# Patient Record
Sex: FEMALE | Race: WHITE | URBAN - METROPOLITAN AREA
[De-identification: names, ages, dates, MRNs, and addresses within clinical notes are randomized per-mention and may not be internally consistent; named-entity substitution may affect disease eponyms.]

---

## 2022-12-06 ENCOUNTER — OFFICE VISIT (OUTPATIENT)
Dept: OBGYN CLINIC | Age: 32
End: 2022-12-06

## 2022-12-06 VITALS
SYSTOLIC BLOOD PRESSURE: 124 MMHG | WEIGHT: 116 LBS | BODY MASS INDEX: 20.55 KG/M2 | DIASTOLIC BLOOD PRESSURE: 70 MMHG | HEIGHT: 63 IN

## 2022-12-06 DIAGNOSIS — N81.4 CYSTOCELE WITH PROLAPSE: ICD-10-CM

## 2022-12-06 DIAGNOSIS — Z11.51 SCREENING FOR HUMAN PAPILLOMAVIRUS: ICD-10-CM

## 2022-12-06 DIAGNOSIS — Z01.419 ENCOUNTER FOR WELL WOMAN EXAM WITH ROUTINE GYNECOLOGICAL EXAM: ICD-10-CM

## 2022-12-06 DIAGNOSIS — Z01.419 ENCOUNTER FOR WELL WOMAN EXAM WITH ROUTINE GYNECOLOGICAL EXAM: Primary | ICD-10-CM

## 2022-12-06 ASSESSMENT — ENCOUNTER SYMPTOMS
BLOOD IN STOOL: 0
ALLERGIC/IMMUNOLOGIC NEGATIVE: 1
NAUSEA: 0
DIARRHEA: 0
RESPIRATORY NEGATIVE: 1
RECTAL PAIN: 0
VOMITING: 0
ANAL BLEEDING: 0
CONSTIPATION: 0
ABDOMINAL DISTENTION: 0
ABDOMINAL PAIN: 0
EYES NEGATIVE: 1

## 2022-12-06 ASSESSMENT — PATIENT HEALTH QUESTIONNAIRE - PHQ9
SUM OF ALL RESPONSES TO PHQ QUESTIONS 1-9: 0
SUM OF ALL RESPONSES TO PHQ9 QUESTIONS 1 & 2: 0
SUM OF ALL RESPONSES TO PHQ QUESTIONS 1-9: 0
SUM OF ALL RESPONSES TO PHQ QUESTIONS 1-9: 0
1. LITTLE INTEREST OR PLEASURE IN DOING THINGS: 0
2. FEELING DOWN, DEPRESSED OR HOPELESS: 0
SUM OF ALL RESPONSES TO PHQ QUESTIONS 1-9: 0

## 2022-12-06 ASSESSMENT — VISUAL ACUITY: OU: 1

## 2022-12-06 NOTE — PROGRESS NOTES
Patient here     Vitals:  /70   Ht 5' 3\" (1.6 m)   Wt 116 lb (52.6 kg)   LMP 2022 (Exact Date)   BMI 20.55 kg/m²   Past Medical History:   Diagnosis Date    Asthma     as a child    Heart murmur     as a child     Past Surgical History:   Procedure Laterality Date    TONSILLECTOMY Bilateral      Allergies:  Patient has no known allergies. No current outpatient medications on file. No current facility-administered medications for this visit. Social History     Socioeconomic History    Marital status: Single     Spouse name: Not on file    Number of children: Not on file    Years of education: Not on file    Highest education level: Not on file   Occupational History    Not on file   Tobacco Use    Smoking status: Former     Types: Cigarettes     Quit date:      Years since quittin.9    Smokeless tobacco: Never   Vaping Use    Vaping Use: Never used   Substance and Sexual Activity    Alcohol use: Yes     Comment: occasionally/ weekends    Drug use: Never    Sexual activity: Yes     Partners: Male     Comment: no BC. Other Topics Concern    Not on file   Social History Narrative    Not on file     Social Determinants of Health     Financial Resource Strain: Not on file   Food Insecurity: Not on file   Transportation Needs: Not on file   Physical Activity: Not on file   Stress: Not on file   Social Connections: Not on file   Intimate Partner Violence: Not on file   Housing Stability: Not on file        Family History   Problem Relation Age of Onset    Endometriosis Mother     Gall Bladder Disease Mother        Review of Systems   Constitutional: Negative. Negative for activity change, appetite change, chills, diaphoresis, fatigue, fever and unexpected weight change. HENT: Negative. Eyes: Negative. Respiratory: Negative. Cardiovascular: Negative.     Gastrointestinal:  Negative for abdominal distention, abdominal pain, anal bleeding, blood in stool, constipation, diarrhea, nausea, rectal pain and vomiting. Endocrine: Negative. Genitourinary:  Negative for decreased urine volume, difficulty urinating, dyspareunia, dysuria, enuresis, flank pain, frequency, genital sores, hematuria, menstrual problem, pelvic pain, urgency, vaginal bleeding, vaginal discharge and vaginal pain. Musculoskeletal: Negative. Skin: Negative. Allergic/Immunologic: Negative. Neurological: Negative. Hematological: Negative. Psychiatric/Behavioral: Negative. Objective:     Physical Exam  Constitutional:       General: She is not in acute distress. Appearance: Normal appearance. She is well-developed. She is not diaphoretic. HENT:      Head: Normocephalic and atraumatic. Eyes:      Conjunctiva/sclera: Conjunctivae normal.   Cardiovascular:      Rate and Rhythm: Normal rate and regular rhythm. Pulmonary:      Effort: Pulmonary effort is normal. No respiratory distress. Abdominal:      General: There is no distension or abdominal bruit. Palpations: Abdomen is soft. Abdomen is not rigid. There is no shifting dullness, fluid wave, hepatomegaly, splenomegaly, mass or pulsatile mass. Tenderness: There is no abdominal tenderness. There is no guarding or rebound. Negative signs include Perez's sign and McBurney's sign. Hernia: No hernia is present. There is no hernia in the umbilical area, ventral area, left inguinal area or right inguinal area. Genitourinary:     Labia:         Right: No rash, tenderness, lesion or injury. Left: No rash, tenderness, lesion or injury. Urethra: No prolapse, urethral pain, urethral swelling or urethral lesion. Vagina: Normal. No signs of injury and foreign body. No vaginal discharge, erythema, tenderness or bleeding. Cervix: No cervical motion tenderness, discharge, friability, lesion, erythema, cervical bleeding or eversion.       Uterus: Normal. Not deviated, not enlarged, not fixed, not tender and no uterine prolapse. Adnexa: Right adnexa normal and left adnexa normal.        Right: No mass, tenderness or fullness. Left: No mass, tenderness or fullness. Rectum: Normal.      Comments: No cervical masses or CMT. No pelvic or adnexal masses; NT.    Musculoskeletal:         General: No tenderness or deformity. Normal range of motion. Cervical back: Normal range of motion and neck supple. Skin:     General: Skin is warm and dry. Coloration: Skin is not pale. Neurological:      Mental Status: She is alert and oriented to person, place, and time. Motor: No abnormal muscle tone. Coordination: Coordination normal.   Psychiatric:         Behavior: Behavior normal.         Thought Content: Thought content normal.         Judgment: Judgment normal.       Assessment:       {No diagnosis found. (Refresh or delete this SmartLink)}     Plan:      Medications placedthis encounter:  No orders of the defined types were placed in this encounter. Orders placedthis encounter:  No orders of the defined types were placed in this encounter. Follow up:  No follow-ups on file.

## 2022-12-06 NOTE — PATIENT INSTRUCTIONS
Patient Education        Cystocele: Care Instructions  Overview     Cystocele (bladder prolapse) occurs when the bladder moves from its normal position and presses against the front wall of the vagina. This is also called anterior vaginal wall prolapse. Cystocele can happen when the muscles and tissues that hold the bladder in place are weak or damaged. This can be caused by pregnancy and childbirth, being overweight, or frequent constipation. Or the muscles and tissues may get weaker as you age. A cystocele usually does not cause serious health problems. But it can be painful or uncomfortable. You may find relief by making lifestyle changes and doing exercises to make the pelvic muscles stronger. Or your doctor may suggest a pessary to help with symptoms. Surgery may also be an option. Follow-up care is a key part of your treatment and safety. Be sure to make and go to all appointments, and call your doctor if you are having problems. It's also a good idea to know your test results and keep a list of the medicines you take. How can you care for yourself at home? Do not lift heavy objects or do anything that puts pressure on your pelvic muscles. Try pelvic floor (Kegel) exercises. These tighten and strengthen pelvic muscles. (If doing these exercises causes pain, stop doing them and talk with your doctor.)  Squeeze your muscles as if you were trying not to pass gas. Or squeeze your muscles as if you were stopping the flow of urine. Your belly, legs, and buttocks shouldn't move. Hold the squeeze for 3 seconds, then relax for 5 to 10 seconds. Start with 3 seconds, then add 1 second each week until you are able to squeeze for 10 seconds. Repeat the exercise 10 times a session. Do 3 to 8 sessions a day. Lie down and put a pillow under your knees. This eases pressure on your vagina. You also can lie on your side and bring your knees up to your chest.  Ask your doctor about a vaginal pessary.  You can place this in your vagina. It supports the bladder. Your doctor can teach you how and when to remove, clean, and reinsert it. If your doctor prescribes vaginal estrogen cream, use it exactly as prescribed. When should you call for help? Watch closely for changes in your health, and be sure to contact your doctor if you have any problems. Where can you learn more? Go to https://chpepiaeweb.Grokr. org and sign in to your [employer] account. Enter S624 in the Net[employer] box to learn more about \"Cystocele: Care Instructions. \"     If you do not have an account, please click on the \"Sign Up Now\" link. Current as of: November 22, 2021               Content Version: 13.4  © 2006-2022 [employer]. Care instructions adapted under license by [facility] ([facility]). If you have questions about a medical condition or this instruction, always ask your healthcare professional. [place] [address] any warranty or liability for your use of this information. Patient Education        Kegel Exercises: Care Instructions  Overview     Kegel exercises strengthen muscles around the bladder. These muscles control the flow of urine. Kegel exercises are sometime called \"pelvic floor\" exercises. They can help prevent urine leakage and keep the pelvic organs in place. Kegel exercises can strengthen pelvic muscles that have been weakened by age, pregnancy, childbirth, and surgery. They may help prevent or treat urine leakage. You do Kegel exercises by squeezing your pelvic floor muscles. You will likely need to do these exercises for several weeks to get better. Follow-up care is a key part of your treatment and safety. Be sure to make and go to all appointments, and call your doctor if you are having problems. It's also a good idea to know your test results and keep a list of the medicines you take. How can you care for yourself at home?   To do Kegel exercises:  Squeeze your muscles as if you were trying not to pass gas. Or squeeze your muscles as if you were stopping the flow of urine. Your belly, legs, and buttocks shouldn't move. Hold the squeeze for 3 seconds, then relax for 5 to 10 seconds. Start with 3 seconds, then add 1 second each week until you are able to squeeze for 10 seconds. Repeat the exercise 10 times a session. Do 3 to 8 sessions a day. When learning what muscles to squeeze, you can try stopping the flow of urine a few times. But don't make it a practice to do Kegels while urinating. If doing these exercises causes pain, stop doing them and talk with your doctor. Sometimes people have pelvic floor muscles that are too tight. In these cases, doing Kegel exercises may cause more problems. Check with your doctor if you don't notice a difference after trying these exercises for several weeks. Your doctor may suggest getting help from a physical therapist or recommend other treatment. Where can you learn more? Go to https://Get10.Purple Harry. org and sign in to your [employer] account. Enter C047 in the [employer] box to learn more about \"Kegel Exercises: Care Instructions. \"     If you do not have an account, please click on the \"Sign Up Now\" link. Current as of: June 16, 2022               Content Version: 13.4  © 9013-0106 [employer]. Care instructions adapted under license by [facility] ([facility]). If you have questions about a medical condition or this instruction, always ask your healthcare professional. [place] [address] any warranty or liability for your use of this information.

## 2022-12-06 NOTE — PROGRESS NOTES
Subjective:      Patient ID: Azeem Win is a 34 y.o. female    Annual exam.  New patient; reviewed medical, surgical, social and family history. Also reviewed current medications and allergies. No GYN complaints. Normal cycles. Pap performed. STD screening offered. Monthly SBE encouraged. F/U annual or prn. Pt also wished to discuss vaginal pressure and occasional frequency. Denies dysuria or urgency. Denies incontinence. States she notices pressure more with increased activity. Denies chronic UTI or pain. Exam with 2-3rd degree cystocele with Valsalva. Discussed Kegel exercises and reduction of heavy lifting pushing and pulling. Also discussed surgical management if problem progresses. All questions answered. Vitals:  /70   Ht 5' 3\" (1.6 m)   Wt 116 lb (52.6 kg)   LMP 2022 (Exact Date)   BMI 20.55 kg/m²   Past Medical History:   Diagnosis Date    Asthma     as a child    Heart murmur     as a child     Past Surgical History:   Procedure Laterality Date    TONSILLECTOMY Bilateral      Allergies:  Patient has no known allergies. No current outpatient medications on file. No current facility-administered medications for this visit. Social History     Socioeconomic History    Marital status: Single     Spouse name: Not on file    Number of children: Not on file    Years of education: Not on file    Highest education level: Not on file   Occupational History    Not on file   Tobacco Use    Smoking status: Former     Types: Cigarettes     Quit date:      Years since quittin.9    Smokeless tobacco: Never   Vaping Use    Vaping Use: Never used   Substance and Sexual Activity    Alcohol use: Yes     Comment: occasionally/ weekends    Drug use: Never    Sexual activity: Yes     Partners: Male     Comment: no BC.    Other Topics Concern    Not on file   Social History Narrative    Not on file     Social Determinants of Health     Financial Resource Strain: Not on file   Food Insecurity: Not on file   Transportation Needs: Not on file   Physical Activity: Not on file   Stress: Not on file   Social Connections: Not on file   Intimate Partner Violence: Not on file   Housing Stability: Not on file     Family History   Problem Relation Age of Onset    Endometriosis Mother     Gall Bladder Disease Mother        Review of Systems   Constitutional: Negative. Negative for activity change, appetite change, chills, diaphoresis, fatigue, fever and unexpected weight change. HENT: Negative. Eyes: Negative. Respiratory: Negative. Cardiovascular: Negative. Gastrointestinal:  Negative for abdominal distention, abdominal pain, anal bleeding, blood in stool, constipation, diarrhea, nausea, rectal pain and vomiting. Endocrine: Negative. Genitourinary:  Positive for frequency and vaginal pain (Pressure intermittently). Negative for decreased urine volume, difficulty urinating, dyspareunia, dysuria, enuresis, flank pain, genital sores, hematuria, menstrual problem, pelvic pain, urgency, vaginal bleeding and vaginal discharge. Musculoskeletal: Negative. Skin: Negative. Allergic/Immunologic: Negative. Neurological: Negative. Hematological: Negative. Psychiatric/Behavioral: Negative. Objective:     Physical Exam  Constitutional:       Appearance: She is well-developed. HENT:      Head: Normocephalic. Eyes:      General: Lids are normal. Vision grossly intact. Neck:      Thyroid: No thyromegaly. Cardiovascular:      Rate and Rhythm: Normal rate and regular rhythm. Heart sounds: Normal heart sounds. Pulmonary:      Effort: Pulmonary effort is normal. No respiratory distress. Breath sounds: Normal breath sounds. No wheezing or rales. Chest:      Chest wall: No tenderness. Breasts:     Right: Normal. No swelling, bleeding, inverted nipple, mass, nipple discharge, skin change or tenderness.       Left: Normal. No swelling, bleeding, inverted nipple, mass, nipple discharge, skin change or tenderness. Abdominal:      General: There is no distension. Palpations: Abdomen is soft. There is no mass. Tenderness: There is no abdominal tenderness. There is no guarding or rebound. Hernia: No hernia is present. There is no hernia in the left inguinal area or right inguinal area. Genitourinary:     General: Normal vulva. Pubic Area: No rash. Labia:         Right: No rash, tenderness, lesion or injury. Left: No rash, tenderness, lesion or injury. Urethra: No prolapse, urethral swelling or urethral lesion. Vagina: No signs of injury and foreign body. Prolapsed vaginal walls (2-3ed degree cystocele with Valsalva) present. No vaginal discharge, erythema, tenderness or bleeding. Cervix: No cervical motion tenderness, discharge or friability. Uterus: Not deviated, not enlarged, not fixed and not tender. Adnexa:         Right: No mass, tenderness or fullness. Left: No mass, tenderness or fullness. Rectum: Normal.   Musculoskeletal:         General: No tenderness. Normal range of motion. Cervical back: Normal range of motion and neck supple. Lymphadenopathy:      Cervical: No cervical adenopathy. Upper Body:      Right upper body: No supraclavicular or axillary adenopathy. Left upper body: No supraclavicular or axillary adenopathy. Lower Body: No right inguinal adenopathy. No left inguinal adenopathy. Skin:     General: Skin is warm and dry. Coloration: Skin is not pale. Findings: No erythema or rash. Neurological:      Mental Status: She is alert and oriented to person, place, and time. Psychiatric:         Behavior: Behavior normal.         Thought Content: Thought content normal.         Judgment: Judgment normal.       Assessment:       Diagnosis Orders   1. Encounter for well woman exam with routine gynecological exam  PAP SMEAR      2. Screening for human papillomavirus  PAP SMEAR      3. Cystocele with prolapse  POCT Urinalysis no Micro           Plan:      Medications placedthis encounter:  No orders of the defined types were placed in this encounter. Orders placedthis encounter:  Orders Placed This Encounter   Procedures    PAP SMEAR     Patient History:    Patient's last menstrual period was 2022 (exact date). OBGYN Status: Having periods  Past Surgical History:  No date: TONSILLECTOMY; Bilateral      Social History    Tobacco Use      Smoking status: Former        Types: Cigarettes        Quit date:         Years since quittin.9      Smokeless tobacco: Never       Standing Status:   Future     Standing Expiration Date:   2023     Order Specific Question:   Collection Type     Answer: Thin Prep     Order Specific Question:   Prior Abnormal Pap Test     Answer:   No     Order Specific Question:   Screening or Diagnostic     Answer:   Screening     Order Specific Question:   HPV Requested? Answer:   Yes     Order Specific Question:   High Risk Patient     Answer:   N/A    POCT Urinalysis no Micro           Follow up:  Return in about 1 year (around 2023) for Annual.  Repeat Annual GYN exam every 1 year. Cervical Cytology exam starts age 24. If Negative Cytology;  follow-up screening per current guidelines. Mammograms yearly starting at age 36. Calcium and Vitamin D dosing reviewed ( age appropriate ). Colonoscopy and bone density screening discussed ( age appropriate ). Birth control and STD prevention discussed ( age appropriate ). Gardisil counseling completed for all patients ( age appropriate ). Routine health maintenance ( per PCP and guidelines ).

## 2022-12-13 LAB
HPV COMMENT: NORMAL
HPV TYPE 16: NOT DETECTED
HPV TYPE 18: NOT DETECTED
HPVOH (OTHER TYPES): NOT DETECTED

## 2023-03-09 LAB — SARS-COV-2 RESULT: NOT DETECTED

## 2023-05-11 ENCOUNTER — OFFICE VISIT (OUTPATIENT)
Dept: PRIMARY CARE | Facility: CLINIC | Age: 33
End: 2023-05-11
Payer: COMMERCIAL

## 2023-05-11 VITALS
TEMPERATURE: 98.1 F | HEART RATE: 85 BPM | HEIGHT: 63 IN | BODY MASS INDEX: 19.99 KG/M2 | WEIGHT: 112.8 LBS | SYSTOLIC BLOOD PRESSURE: 105 MMHG | DIASTOLIC BLOOD PRESSURE: 68 MMHG

## 2023-05-11 DIAGNOSIS — O34.80: ICD-10-CM

## 2023-05-11 DIAGNOSIS — G43.909 MIGRAINE WITHOUT STATUS MIGRAINOSUS, NOT INTRACTABLE, UNSPECIFIED MIGRAINE TYPE: Primary | ICD-10-CM

## 2023-05-11 PROCEDURE — 99203 OFFICE O/P NEW LOW 30 MIN: CPT | Performed by: FAMILY MEDICINE

## 2023-05-11 PROCEDURE — 1036F TOBACCO NON-USER: CPT | Performed by: FAMILY MEDICINE

## 2023-05-11 RX ORDER — SUMATRIPTAN 50 MG/1
50 TABLET, FILM COATED ORAL ONCE AS NEEDED
Qty: 27 TABLET | Refills: 3 | Status: SHIPPED | OUTPATIENT
Start: 2023-05-11 | End: 2024-05-10

## 2023-05-11 NOTE — PROGRESS NOTES
"Anjelica Stout is a 33 y.o. female who presents for Follow-up (Re-est/Discuss migraines/).    Migraine trigers are premenstrual, chocolate, added sugar    3 migraine days a month  Severe and disabling ~1/3 at a time      Always last at least 1 day up to 4 days duration, average suration of migrian e2 days      Dx with cystocele, thinks pap was 1/2023  Kegel exercises have helped         Objective   /68   Pulse 85   Temp 36.7 °C (98.1 °F)   Ht 1.6 m (5' 3\")   Wt 51.2 kg (112 lb 12.8 oz)   BMI 19.98 kg/m²     Physical Exam  Vitals reviewed.   Constitutional:       General: She is not in acute distress.     Appearance: Normal appearance.   HENT:      Head: Normocephalic and atraumatic.   Cardiovascular:      Rate and Rhythm: Normal rate and regular rhythm.      Heart sounds: No murmur heard.  Pulmonary:      Effort: Pulmonary effort is normal.      Breath sounds: No wheezing, rhonchi or rales.   Musculoskeletal:      Right lower leg: No edema.      Left lower leg: No edema.   Neurological:      Mental Status: She is alert.   Psychiatric:         Mood and Affect: Mood normal.         Behavior: Behavior normal.         Assessment/Plan   Problem List Items Addressed This Visit          Genitourinary    Cystocele affecting postpartum period    Relevant Orders    Referral to Physical Therapy    Referral to Urology       Other    Migraine - Primary    Relevant Medications    SUMAtriptan (Imitrex) 50 mg tablet   Medication risks and benefits discussed.  Patient advised to follow up as needed or if any concerns arise.         "

## 2023-08-24 ENCOUNTER — OFFICE VISIT (OUTPATIENT)
Dept: PRIMARY CARE | Facility: CLINIC | Age: 33
End: 2023-08-24
Payer: COMMERCIAL

## 2023-08-24 VITALS
TEMPERATURE: 98.1 F | SYSTOLIC BLOOD PRESSURE: 112 MMHG | DIASTOLIC BLOOD PRESSURE: 79 MMHG | HEART RATE: 97 BPM | HEIGHT: 63 IN | BODY MASS INDEX: 20.04 KG/M2 | WEIGHT: 113.13 LBS

## 2023-08-24 DIAGNOSIS — G43.909 MIGRAINE WITHOUT STATUS MIGRAINOSUS, NOT INTRACTABLE, UNSPECIFIED MIGRAINE TYPE: ICD-10-CM

## 2023-08-24 DIAGNOSIS — Z00.00 ANNUAL PHYSICAL EXAM: ICD-10-CM

## 2023-08-24 DIAGNOSIS — O34.80: Primary | ICD-10-CM

## 2023-08-24 PROBLEM — D22.39 MELANOCYTIC NEVI OF OTHER PARTS OF FACE: Status: RESOLVED | Noted: 2022-09-23 | Resolved: 2023-08-24

## 2023-08-24 PROCEDURE — 99213 OFFICE O/P EST LOW 20 MIN: CPT | Performed by: NURSE PRACTITIONER

## 2023-08-24 PROCEDURE — 1036F TOBACCO NON-USER: CPT | Performed by: NURSE PRACTITIONER

## 2023-08-24 ASSESSMENT — ENCOUNTER SYMPTOMS
WEAKNESS: 0
SLEEP DISTURBANCE: 0
VOMITING: 0
CONSTIPATION: 0
NAUSEA: 0
COUGH: 0
AGITATION: 0
NERVOUS/ANXIOUS: 0
FEVER: 0
SHORTNESS OF BREATH: 0
DIARRHEA: 0
FATIGUE: 0

## 2023-08-24 ASSESSMENT — PATIENT HEALTH QUESTIONNAIRE - PHQ9
2. FEELING DOWN, DEPRESSED OR HOPELESS: NOT AT ALL
1. LITTLE INTEREST OR PLEASURE IN DOING THINGS: NOT AT ALL
SUM OF ALL RESPONSES TO PHQ9 QUESTIONS 1 AND 2: 0

## 2023-08-24 NOTE — PROGRESS NOTES
"Subjective   Patient ID: Juju Stout is a 33 y.o. female who presents for Annual Exam.    Also need physical form filled out for work. No complaints currently         Review of Systems   Constitutional:  Negative for fatigue and fever.   Respiratory:  Negative for cough and shortness of breath.    Cardiovascular:  Negative for chest pain and leg swelling.   Gastrointestinal:  Negative for constipation, diarrhea, nausea and vomiting.   Skin:  Negative for pallor and rash.   Neurological:  Negative for weakness.   Psychiatric/Behavioral:  Negative for agitation, behavioral problems and sleep disturbance. The patient is not nervous/anxious.        Objective   /79   Pulse 97   Temp 36.7 °C (98.1 °F)   Ht 1.6 m (5' 3\")   Wt 51.3 kg (113 lb 2 oz)   BMI 20.04 kg/m²     Physical Exam  Vitals and nursing note reviewed.   Constitutional:       General: She is not in acute distress.  HENT:      Head: Normocephalic and atraumatic.   Eyes:      Pupils: Pupils are equal, round, and reactive to light.   Cardiovascular:      Rate and Rhythm: Normal rate and regular rhythm.   Pulmonary:      Effort: Pulmonary effort is normal.      Breath sounds: Normal breath sounds.   Skin:     General: Skin is warm and dry.   Neurological:      Mental Status: She is alert.   Psychiatric:         Mood and Affect: Mood normal.         Assessment/Plan   Problem List Items Addressed This Visit       Migraine    Relevant Orders    Basic Metabolic Panel    CBC    TSH with reflex to Free T4 if abnormal    Vitamin D, Total    Lipid Panel    Cystocele affecting postpartum period - Primary    Relevant Orders    Basic Metabolic Panel    CBC    TSH with reflex to Free T4 if abnormal    Vitamin D, Total    Lipid Panel          "

## 2024-02-14 ENCOUNTER — OFFICE VISIT (OUTPATIENT)
Dept: PRIMARY CARE | Facility: CLINIC | Age: 34
End: 2024-02-14
Payer: COMMERCIAL

## 2024-02-14 VITALS
TEMPERATURE: 98.2 F | HEART RATE: 75 BPM | BODY MASS INDEX: 20.39 KG/M2 | DIASTOLIC BLOOD PRESSURE: 71 MMHG | WEIGHT: 115.13 LBS | SYSTOLIC BLOOD PRESSURE: 123 MMHG

## 2024-02-14 DIAGNOSIS — O34.80: ICD-10-CM

## 2024-02-14 DIAGNOSIS — R22.30 NODULE OF SKIN OF UPPER EXTREMITY: ICD-10-CM

## 2024-02-14 DIAGNOSIS — R09.82 POST-NASAL DRIP: ICD-10-CM

## 2024-02-14 DIAGNOSIS — H61.21 IMPACTED CERUMEN OF RIGHT EAR: ICD-10-CM

## 2024-02-14 DIAGNOSIS — R49.9 HOARSENESS OR CHANGING VOICE: Primary | ICD-10-CM

## 2024-02-14 PROCEDURE — 99213 OFFICE O/P EST LOW 20 MIN: CPT | Performed by: NURSE PRACTITIONER

## 2024-02-14 PROCEDURE — 1036F TOBACCO NON-USER: CPT | Performed by: NURSE PRACTITIONER

## 2024-02-14 ASSESSMENT — ENCOUNTER SYMPTOMS
AGITATION: 0
COUGH: 0
NAUSEA: 0
SLEEP DISTURBANCE: 0
CONSTIPATION: 0
TROUBLE SWALLOWING: 0
FEVER: 0
SORE THROAT: 0
NERVOUS/ANXIOUS: 0
VOICE CHANGE: 1
SHORTNESS OF BREATH: 0
SINUS PRESSURE: 0
DIARRHEA: 0
VOMITING: 0
FATIGUE: 0
WEAKNESS: 0

## 2024-02-14 NOTE — PROGRESS NOTES
Subjective   Patient ID: Juju Stout is a 34 y.o. female who presents for Hoarseness (Loss of voice 3wks ago and ever since has been constantly hourse/Does sing and can't sing anymore due to , wants to make sure nothing is wrong/No covid sx).    She had a virus 3 -4 weeks ago, she still has a horse voice, she is a kennedy and likes to talk. She is concerned she will not get her voice back. She currently has no other URI symptoms.    RT ear is with impacted cerumen, staff unable to flush or remove, only a little.     She has a LT wrist nodule that has been there for a year or more, it has grown in size. IT is firm and not very mobile. She states it only bothers her once in a while.    She has had some pelvic floor issues post partum and was referred to PT but she never went, requesting another referral         Review of Systems   Constitutional:  Negative for fatigue and fever.   HENT:  Positive for voice change. Negative for congestion, ear pain, postnasal drip, sinus pressure, sore throat and trouble swallowing.    Respiratory:  Negative for cough and shortness of breath.    Cardiovascular:  Negative for chest pain and leg swelling.   Gastrointestinal:  Negative for constipation, diarrhea, nausea and vomiting.   Skin:  Negative for pallor and rash.   Neurological:  Negative for weakness.   Psychiatric/Behavioral:  Negative for agitation, behavioral problems and sleep disturbance. The patient is not nervous/anxious.        Objective   /71   Pulse 75   Temp 36.8 °C (98.2 °F)   Wt 52.2 kg (115 lb 2 oz)   BMI 20.39 kg/m²     Physical Exam  Vitals and nursing note reviewed.   Constitutional:       General: She is not in acute distress.  HENT:      Head: Normocephalic and atraumatic.      Right Ear: There is impacted cerumen.      Left Ear: Tympanic membrane and ear canal normal.      Mouth/Throat:      Mouth: Mucous membranes are moist.      Pharynx: Oropharynx is clear.      Comments: Mild erythema  Eyes:       Pupils: Pupils are equal, round, and reactive to light.   Cardiovascular:      Rate and Rhythm: Normal rate and regular rhythm.   Pulmonary:      Effort: Pulmonary effort is normal.      Breath sounds: Normal breath sounds.   Skin:     General: Skin is warm and dry.   Neurological:      Mental Status: She is alert.   Psychiatric:         Mood and Affect: Mood normal.         Assessment/Plan   Problem List Items Addressed This Visit             ICD-10-CM    Cystocele affecting postpartum period O34.80    Relevant Orders    Referral to Physical Therapy    Impacted cerumen of right ear H61.21     Attempts to flush unsuccessful, referred to ENT  Use Debrox gtt OTC BID x 5 days           Other Visit Diagnoses         Codes    Hoarseness or changing voice    -  Primary R49.9    Relevant Orders    Referral to ENT    Nodule of skin of upper extremity     R22.30    Relevant Orders    US nonvascular extremity US extremity nonvascular real time w image documentation complete    Post-nasal drip     R09.82

## 2024-02-15 ENCOUNTER — HOSPITAL ENCOUNTER (OUTPATIENT)
Dept: RADIOLOGY | Facility: CLINIC | Age: 34
Discharge: HOME | End: 2024-02-15
Payer: COMMERCIAL

## 2024-02-15 DIAGNOSIS — R22.30 NODULE OF SKIN OF UPPER EXTREMITY: ICD-10-CM

## 2024-02-15 PROCEDURE — 76881 US COMPL JOINT R-T W/IMG: CPT

## 2024-02-15 PROCEDURE — 76882 US LMTD JT/FCL EVL NVASC XTR: CPT | Performed by: RADIOLOGY

## 2024-05-08 ENCOUNTER — TELEPHONE (OUTPATIENT)
Dept: PRIMARY CARE | Facility: CLINIC | Age: 34
End: 2024-05-08
Payer: COMMERCIAL

## 2024-05-08 NOTE — TELEPHONE ENCOUNTER
Pt calling saying she had a ultrasound in February and is calling for the results. She was never notified and would like a call back

## 2024-05-09 NOTE — TELEPHONE ENCOUNTER
Left msg for patient that we received her message.  I also let her know that Ayesha tried to call her but there was no answer. I also left msg that  msg of the results was sent to her thru [employer] and not sure if she is using [employer] but that she does have access to it.    I asked her to give us a call back to let us know she got this message or to send us a message thru [employer] to let us know.

## 2024-05-30 ENCOUNTER — OFFICE VISIT (OUTPATIENT)
Dept: OTOLARYNGOLOGY | Facility: CLINIC | Age: 34
End: 2024-05-30
Payer: COMMERCIAL

## 2024-05-30 VITALS
BODY MASS INDEX: 20.39 KG/M2 | SYSTOLIC BLOOD PRESSURE: 115 MMHG | TEMPERATURE: 97.3 F | HEIGHT: 63 IN | DIASTOLIC BLOOD PRESSURE: 70 MMHG

## 2024-05-30 DIAGNOSIS — K21.9 LARYNGOPHARYNGEAL REFLUX (LPR): Primary | ICD-10-CM

## 2024-05-30 DIAGNOSIS — R49.0 HOARSENESS: ICD-10-CM

## 2024-05-30 DIAGNOSIS — J35.1 LINGUAL TONSIL HYPERTROPHY: ICD-10-CM

## 2024-05-30 DIAGNOSIS — J39.2 CYST OF NASOPHARYNX: ICD-10-CM

## 2024-05-30 PROCEDURE — 1036F TOBACCO NON-USER: CPT | Performed by: OTOLARYNGOLOGY

## 2024-05-30 PROCEDURE — 31575 DIAGNOSTIC LARYNGOSCOPY: CPT | Performed by: OTOLARYNGOLOGY

## 2024-05-30 PROCEDURE — 99204 OFFICE O/P NEW MOD 45 MIN: CPT | Performed by: OTOLARYNGOLOGY

## 2024-05-30 RX ORDER — OMEPRAZOLE 20 MG/1
CAPSULE, DELAYED RELEASE ORAL
Qty: 90 CAPSULE | Refills: 3 | Status: SHIPPED | OUTPATIENT
Start: 2024-05-30

## 2024-05-30 NOTE — PROGRESS NOTES
Impression:  1. Laryngopharyngeal reflux (LPR)  omeprazole (PriLOSEC) 20 mg DR capsule      2. Hoarseness  Referral to ENT      3. Lingual tonsil hypertrophy        4. Cyst of nasopharynx             RECOMMENDATIONS/PLAN :  I explained to the patient that she does have some swelling along the base of her tongue/lingual tonsils as well as some mild swelling along the back of her larynx due to laryngopharyngeal reflux.  We will place her on omeprazole 20 mg daily for the next 5 to 6 months.  She will continue to drink plenty of water and avoid excessive caffeine.  I reassured the patient that she has a very small benign-appearing cyst in her nasopharynx and we will simply follow this.  She will call and let me know how she is doing over the next 5 to 6 months.      **This electronic medical record note was created with the use of voice recognition software.  Despite proofreading, typographical or grammatical errors may be present that could affect meaning of content **    Subjective   Patient ID:     Juju Stout is a 34 y.o. female who presents to the office today complaining of intermittent hoarseness.  Her voice is slightly better today.  She denies significant allergy drainage or postnasal drip.  Sometimes she has a fullness feeling in her throat.  She does clear her throat frequently.  She did stop smoking and she is drinking a fair amount of caffeine.  She used to sing quite frequently however this has been somewhat difficult for her.  She denies any true heartburn symptoms.    ROS:  A detailed 12 system review of systems is noted on the intake form has been reviewed with the patient with details noted in the HPI and scanned into the patient's medical record.    Objective     Past Medical History:   Diagnosis Date    Melanocytic nevi of other parts of face 09/23/2022        Past Surgical History:   Procedure Laterality Date    OTHER SURGICAL HISTORY  09/16/2019    Tonsillectomy        Allergies   Allergen  "Reactions    Cefaclor Swelling          Current Outpatient Medications:     omeprazole (PriLOSEC) 20 mg DR capsule, 20 mg by mouth daily, Disp: 90 capsule, Rfl: 3    SUMAtriptan (Imitrex) 50 mg tablet, Take 1 tablet (50 mg) by mouth 1 time if needed for migraine. May repeat after 2 hours., Disp: 27 tablet, Rfl: 3     Tobacco Use: Low Risk  (5/30/2024)    Patient History     Smoking Tobacco Use: Never     Smokeless Tobacco Use: Never     Passive Exposure: Not on file        Alcohol Use: Not on file        Social History     Substance and Sexual Activity   Drug Use Never        Physical Exam:  Visit Vitals  /70   Temp 36.3 °C (97.3 °F) (Temporal)   Ht 1.6 m (5' 3\")   BMI 20.39 kg/m²   Smoking Status Never   BSA 1.52 m²      General: Patient is alert, oriented, cooperative in no apparent distress.  Head: Normocephalic, atraumatic.  Eyes: PERRL, EOMI, Conjunctiva is clear. No nystagmus.  Ears: Right Ear-- Pinna is normal.  External auditory canal is patent. Tympanic membrane is [intact, translucent and has good mobility with my pneumatic otoscope. No effusion].  Mastoid is nontender.  Left ear-- Pinna is normal.  External auditory canal is patent. Tympanic membrane is [intact, translucent and has good mobility with my pneumatic otoscope.  No effusion].  Mastoid is nontender.  Nose: Septum is straight.  No septal perforation or lesions. No septal hematoma/ seroma.  No signs of bleeding.  Inferior turbinates are normal.   No evidence of intranasal polyps.  No infectious drainage.  Throat:  Floor of mouth is clear, no masses.  Tongue appears normal, no lesions or masses. Gums, gingiva, buccal mucosa appear pink and moist, no lesions. Teeth are in good repair.  No obvious dental infections.  Peritonsillar regions appear symmetric without swelling.  Hard and soft palate appear normal, no obvious cleft. Uvula is midline.  Oropharynx: No lesions. Retropharyngeal wall is flat.  No active postnasal drip.  Neck: Supple,  no " lymphadenopathy.  No masses.  Salivary Glands: Symmetric bilaterally.  No palpable masses.  No evidence of acute infection or salivary stones  Neurologic: Cranial Nerves 2-12 are grossly intact without focal deficits. Cerebellar function testing is normal.     Results:   []    Procedure:   Pre Op Diagnosis: Hoarseness-rule out vocal cord nodules  Post Op Diagnosis: Same  Procedure: Flexible Nasopharyngolaryngoscopy  Surgeon: Marvin Trotter DO  Assist: None  Anesthesia: Topical Lidocaine 4%/ 0.05% Afrin 1:1 mixture  EBL: None  Complications: None  Disposition: The patient tolerated the procedure well. There were no complications.    Procedure:  After informed consent was obtained with the risks, benefits, complications and alternatives explained to the patient/ guardian, the patient was sat up in the ENT chair and the nose was anesthetized and decongested with topical  4% lidocaine and 0.05% Afrin. After allowing this to take effect, the flexible nasopharyngoscope was advanced thru the nostrils and down to the larynx. The following areas were visualized:  The nasal passages, septum, nasopharynx, sinus ostia, base of tongue, epiglottis, true and false vocal folds, arytenoids, post cricoid region and subglottis were all examined and found to be normal except as follows:  Septum is relatively straight.  Her ostiomeatal complexes are clear bilaterally.  No pus polyps or lesions.  Nasopharynx reveals a very small central benign-appearing cyst/Tornwaldt cyst.   Base of tongue reveals moderate lingual tonsil hypertrophy.  Epiglottis is normal.  True and false vocal cords are approximating equally bilaterally.  No nodules polyps or lesions.  Arytenoids show mild to moderate edema consistent with silent reflux/laryngopharyngeal reflux.      The patient tolerated the procedure well and there were no complications.      Marvin Trotter DO

## 2024-06-24 DIAGNOSIS — G43.909 MIGRAINE WITHOUT STATUS MIGRAINOSUS, NOT INTRACTABLE, UNSPECIFIED MIGRAINE TYPE: ICD-10-CM

## 2024-06-24 RX ORDER — SUMATRIPTAN 50 MG/1
50 TABLET, FILM COATED ORAL ONCE AS NEEDED
Qty: 27 TABLET | Refills: 3 | Status: SHIPPED | OUTPATIENT
Start: 2024-06-24 | End: 2025-06-24

## 2025-04-23 ENCOUNTER — OFFICE VISIT (OUTPATIENT)
Dept: URGENT CARE | Age: 35
End: 2025-04-23
Payer: COMMERCIAL

## 2025-04-23 VITALS
TEMPERATURE: 97.8 F | RESPIRATION RATE: 18 BRPM | HEIGHT: 63 IN | DIASTOLIC BLOOD PRESSURE: 72 MMHG | OXYGEN SATURATION: 97 % | BODY MASS INDEX: 20.38 KG/M2 | SYSTOLIC BLOOD PRESSURE: 117 MMHG | HEART RATE: 80 BPM | WEIGHT: 115 LBS

## 2025-04-23 DIAGNOSIS — R68.89 FLU-LIKE SYMPTOMS: ICD-10-CM

## 2025-04-23 LAB
POC CORONAVIRUS SARS-COV-2 PCR: NEGATIVE
POC HUMAN RHINOVIRUS PCR: NEGATIVE
POC INFLUENZA A VIRUS PCR: NEGATIVE
POC INFLUENZA B VIRUS PCR: NEGATIVE
POC RESPIRATORY SYNCYTIAL VIRUS PCR: NEGATIVE

## 2025-04-23 RX ORDER — ONDANSETRON 4 MG/1
4 TABLET, ORALLY DISINTEGRATING ORAL EVERY 8 HOURS PRN
Qty: 10 TABLET | Refills: 0 | Status: SHIPPED | OUTPATIENT
Start: 2025-04-23 | End: 2025-04-30

## 2025-04-23 ASSESSMENT — PATIENT HEALTH QUESTIONNAIRE - PHQ9
1. LITTLE INTEREST OR PLEASURE IN DOING THINGS: NOT AT ALL
2. FEELING DOWN, DEPRESSED OR HOPELESS: NOT AT ALL
SUM OF ALL RESPONSES TO PHQ9 QUESTIONS 1 & 2: 0

## 2025-04-23 ASSESSMENT — PAIN SCALES - GENERAL: PAINLEVEL_OUTOF10: 0-NO PAIN

## 2025-04-23 NOTE — LETTER
April 23, 2025     Patient: Juju Stout   YOB: 1990   Date of Visit: 4/23/2025       To Whom It May Concern:    Juju Stout was seen in my clinic on 4/23/2025 at 11:05 am. Please excuse Juju for her absence from work on this day to make the appointment.    If you have any questions or concerns, please don't hesitate to call.         Sincerely,         [facility]        CC: No Recipients  
Yes

## 2025-04-23 NOTE — PROGRESS NOTES
"Subjective   Patient ID: Juju Stout is a 35 y.o. female. They present today with a chief complaint of Generalized Body Aches, Fatigue, Nausea (Started today ), Cough (Started yesterday ), and Diarrhea (Started today ).    Patient disposition: Home    History of Present Illness  HPI  1-2 day of bodyaches, fatigue, nausea, diarrhea, cough.  Taking ibuprofen with some improvement of symptoms.  Feels fatigued this morning, slept more than normal.  Patient works with infants at work, several of them were sick lately, coughing, 1 with GI symptoms.  Has been able to eat eat small amount of food this morning.  No other medications taken.  No seasonal allergies this time of year.  Childhood asthma.  No runny nose or congestion.  No other complaints or symptoms.      Past Medical History  Allergies as of 04/23/2025 - Reviewed 05/30/2024   Allergen Reaction Noted    Cefaclor Swelling 05/11/2023       Prescriptions Prior to Admission[1]     Current Medications[2]    Problem List[3]    Surgical History[4]     reports that she has never smoked. She has never been exposed to tobacco smoke. She has never used smokeless tobacco. She reports current alcohol use. She reports that she does not use drugs.    Review of Systems  As noted in HPI. ROS otherwise negative unless noted.       Objective    Vitals:    04/23/25 1029   BP: 117/72   BP Location: Left arm   Patient Position: Sitting   Pulse: 80   Resp: 18   Temp: 36.6 °C (97.8 °F)   SpO2: 97%   Weight: 52.2 kg (115 lb)   Height: 1.6 m (5' 3\")     Patient's last menstrual period was 04/02/2025.    Physical Exam  Constitutional: vital signs reviewed. Well developed, well nourished. patient alert and patient without distress.   Head and Face: Normal and atraumatic.    Ears, Nose, Mouth, and Throat:   Hearing: Normal.  External inspection of nose: Normal.   Lips, teeth, tongue and gums: Normal and well hydrated. External inspection of ears: Normal. Ear canals and TMs: Normal.  " Posterior pharynx moist, no exudate, symmetric, no abscess, and with post nasal drip.  Nasal mucosa normal  Neck: No neck mass was observed. Supple. normal muscle tone.   Abdomen: Soft, epigastric tender, nondistended, normal bowel sounds, no masses.  Normal bowel sounds x 4  Cardiovascular: Heart rate normal, normal S1 and S2, no gallops, no murmurs and no pericardial rub. Rhythm: Normal.  Pulmonary: No respiratory distress. Palpation of chest: Normal. Clear bilateral breath sounds.   Lymphatic: No cervical lymphadenopathy  Psych: Normal mood and affect        Procedures    Point of Care Test & Imaging Results from this visit  Results for orders placed or performed in visit on 03/08/23   Sars-CoV-2 PCR, Symptomatic    Collection Time: 03/08/23  5:16 PM   Result Value Ref Range    SARS-CoV-2 Result NOT DETECTED Not Detected            Diagnostic study results (if any) were reviewed.  (If applicable) preliminary radiology reading: None    Assessment/Plan   Allergies, medications, history, and pertinent labs/EKGs/Imaging reviewed.        Medical Decision Making  See note    Orders and Diagnoses  Diagnoses and all orders for this visit:  Cough in adult  -     POCT SPOTFIRE R/ST Panel Mini w/COVID ([facility]) manually resulted      Medical Admin Record      Follow Up Instructions  No follow-ups on file.    At time of discharge patient was clinically well-appearing and HDS for outpatient management. The patient and/or family was educated regarding diagnosis, supportive care, OTC and Rx medications. The patient and/or family was given the opportunity to ask questions prior to discharge and all questions answered. They verbalized understanding of my discussion of the plans for treatment, expected course, indications to return to  or seek further evaluation in ED, and the need for timely follow up as directed.      Electronically signed by [facility]           [1] (Not in a hospital admission)  [2]   Current  Outpatient Medications   Medication Sig Dispense Refill    omeprazole (PriLOSEC) 20 mg DR capsule 20 mg by mouth daily 90 capsule 3    SUMAtriptan (Imitrex) 50 mg tablet Take 1 tablet (50 mg) by mouth 1 time if needed for migraine. May repeat after 2 hours. 27 tablet 3     No current facility-administered medications for this visit.   [3]   Patient Active Problem List  Diagnosis    Migraine    Cystocele affecting postpartum period ([facility])    Annual physical exam    Impacted cerumen of right ear   [4]   Past Surgical History:  Procedure Laterality Date    OTHER SURGICAL HISTORY  09/16/2019    Tonsillectomy

## 2025-04-23 NOTE — PATIENT INSTRUCTIONS
A rapid PCR test was performed today including tests for Influenza A, influenza B, RSV, rhinovirus (common cold virus), Covid-19.  The results were: Negative    Electrolyte solutions containing Glucose (not Artificial Sweetener) or Pedialyte, Rehydrate or Infalyte in children  Gatorade or similar, but requires 1:1 dilution with water to half strength    Early reintroduction of food is recommended  BRAT diet: Includes Bananas, rice, apple sauce, toast, soup, crackers    Agents that may worsen diarrhea: Drinks with caffeine, Sorbitol, Lactose and dairy products    Probiotics may help reduce symptoms    Antidiarrheal medications: Loperamide (Imodium) unless you have fever or bloody stool   Bismuth Subsalicylate (Pepto-Bismol) May be used in diarrhea in adults    An anti-nausea medication will be prescribed, use as needed as directed

## 2025-04-24 ENCOUNTER — OFFICE VISIT (OUTPATIENT)
Dept: URGENT CARE | Age: 35
End: 2025-04-24
Payer: COMMERCIAL

## 2025-04-24 VITALS
HEART RATE: 88 BPM | RESPIRATION RATE: 17 BRPM | DIASTOLIC BLOOD PRESSURE: 77 MMHG | SYSTOLIC BLOOD PRESSURE: 124 MMHG | TEMPERATURE: 98.7 F | OXYGEN SATURATION: 98 %

## 2025-04-24 DIAGNOSIS — B09 VIRAL EXANTHEM: Primary | ICD-10-CM

## 2025-04-24 DIAGNOSIS — L29.9 GENERALIZED PRURITUS: ICD-10-CM

## 2025-04-24 RX ORDER — PREDNISONE 10 MG/1
10 TABLET ORAL 2 TIMES DAILY
Qty: 10 TABLET | Refills: 0 | Status: SHIPPED | OUTPATIENT
Start: 2025-04-24 | End: 2025-04-29

## 2025-04-24 NOTE — PROGRESS NOTES
Subjective   Patient ID: Juju Stout is a 35 y.o. female. They present today with a chief complaint of Rash (Rash started last week. Spots on legs, and now scabby. Now on arms and itchy. ).    History of Present Illness  Subjective  Juju Stout is a 35 y.o. female who presents for evaluation of rash. Rash started 1 week ago. Initial distribution: bilateral arm and bilateral lower leg. Lesions are red and pink in color, are of raised texture. Rash has changed over time.  Rash is pruritic. Associated symptoms: vomiting and diarrhea, which are resolved. Patient denies: abdominal pain, congestion, cough, fever, headache, myalgia, and sore throat. Patient has not had previous evaluation of rash. Patient has not had previous treatment. Response to treatment: none. Patient has had contacts with similar rash. Patient has had new exposures.    Review of Systems   Constitutional:  Denies fever, chills, malaise, fatigue  ENT: Denies sore throat  Respiratory:  Denies shortness of breath,  cough, wheezing sputum production  Cardiovascular:  Denies chest pain, palpitations, lightheadedness, dizziness, syncope.    Gastrointestinal:  See HPI   Musculoskeletal:  Denies decreased range of motion, lower extremity swelling, arthralgia, myalgia, back pain   Integumentary:  See HPI  Neurologic:  Denies numbness, weakness, paresthesia    All other systems are negative        History provided by:  Patient   used: No    Rash        Past Medical History  Allergies as of 04/24/2025 - Reviewed 04/24/2025   Allergen Reaction Noted    Cefaclor Swelling 05/11/2023       Prescriptions Prior to Admission[1]     Medical History[2]    Surgical History[3]     reports that she has never smoked. She has never been exposed to tobacco smoke. She has never used smokeless tobacco. She reports current alcohol use. She reports that she does not use drugs.    Review of Systems  Review of Systems   Skin:  Positive for rash.                                   Objective    Vitals:    04/24/25 1918   BP: 124/77   Pulse: 88   Resp: 17   Temp: 37.1 °C (98.7 °F)   SpO2: 98%     Patient's last menstrual period was 04/02/2025.    Physical Exam  Vitals and nursing note reviewed.   Constitutional:       General: She is not in acute distress.     Appearance: Normal appearance. She is normal weight. She is not ill-appearing, toxic-appearing or diaphoretic.   Cardiovascular:      Rate and Rhythm: Normal rate.   Pulmonary:      Effort: Pulmonary effort is normal.   Musculoskeletal:         General: No swelling or tenderness.   Skin:     General: Skin is warm and dry.      Coloration: Skin is not ashen, cyanotic, jaundiced, mottled, pale or sallow.      Findings: Erythema, rash and wound present. No abscess, bruising, ecchymosis, signs of injury or petechiae. Rash is papular.      Comments: Bilateral upper and lower extremities with erythematous papules.  Right medial calf with excoriation and scab, but no cellulitis.   Neurological:      Mental Status: She is alert.         Procedures    Point of Care Test & Imaging Results from this visit  No results found for this visit on 04/24/25.   Imaging  No results found.    Cardiology, Vascular, and Other Imaging  No other imaging results found for the past 2 days      Diagnostic study results (if any) were reviewed by MARYURI Quispe.    Assessment/Plan   Allergies, medications, history, and pertinent labs/EKGs/Imaging reviewed by MARYURI Quispe.     Medical Decision Making    No evidence of cellulitis or tinea.  Likely viral exanthem.  Prescribed short course of corticosteroid and discussed symptomatic care at home.     Orders and Diagnoses  There are no diagnoses linked to this encounter.    Medical Admin Record      Patient disposition: Home    Electronically signed by MARYURI Quispe  7:34 PM           [1] (Not in a hospital admission)  [2]   Past Medical History:  Diagnosis Date     Melanocytic nevi of other parts of face 09/23/2022   [3]   Past Surgical History:  Procedure Laterality Date    OTHER SURGICAL HISTORY  09/16/2019    Tonsillectomy

## 2025-08-27 ENCOUNTER — APPOINTMENT (OUTPATIENT)
Dept: PRIMARY CARE | Facility: CLINIC | Age: 35
End: 2025-08-27
Payer: COMMERCIAL